# Patient Record
Sex: MALE | URBAN - METROPOLITAN AREA
[De-identification: names, ages, dates, MRNs, and addresses within clinical notes are randomized per-mention and may not be internally consistent; named-entity substitution may affect disease eponyms.]

---

## 2018-01-29 ENCOUNTER — RECORDS - HEALTHEAST (OUTPATIENT)
Dept: LAB | Facility: CLINIC | Age: 53
End: 2018-01-29

## 2018-01-30 LAB
ANION GAP SERPL CALCULATED.3IONS-SCNC: 14 MMOL/L (ref 5–18)
BUN SERPL-MCNC: 7 MG/DL (ref 8–22)
CALCIUM SERPL-MCNC: 9.9 MG/DL (ref 8.5–10.5)
CHLORIDE BLD-SCNC: 102 MMOL/L (ref 98–107)
CHOLEST SERPL-MCNC: 267 MG/DL
CO2 SERPL-SCNC: 25 MMOL/L (ref 22–31)
CREAT SERPL-MCNC: 1.01 MG/DL (ref 0.7–1.3)
FASTING STATUS PATIENT QL REPORTED: ABNORMAL
GFR SERPL CREATININE-BSD FRML MDRD: >60 ML/MIN/1.73M2
GLUCOSE BLD-MCNC: 96 MG/DL (ref 70–125)
HDLC SERPL-MCNC: 51 MG/DL
LDLC SERPL CALC-MCNC: 124 MG/DL
LDLC SERPL CALC-MCNC: ABNORMAL MG/DL
POTASSIUM BLD-SCNC: 3.8 MMOL/L (ref 3.5–5)
PSA SERPL-MCNC: 0.7 NG/ML (ref 0–3.5)
SODIUM SERPL-SCNC: 141 MMOL/L (ref 136–145)
TRIGL SERPL-MCNC: 540 MG/DL

## 2018-09-17 ENCOUNTER — RECORDS - HEALTHEAST (OUTPATIENT)
Dept: LAB | Facility: CLINIC | Age: 53
End: 2018-09-17

## 2018-09-18 LAB — BACTERIA SPEC CULT: NO GROWTH

## 2019-02-27 ENCOUNTER — RECORDS - HEALTHEAST (OUTPATIENT)
Dept: LAB | Facility: CLINIC | Age: 54
End: 2019-02-27

## 2019-02-28 LAB — BACTERIA SPEC CULT: NO GROWTH

## 2020-11-11 ENCOUNTER — PATIENT OUTREACH (OUTPATIENT)
Dept: CARE COORDINATION | Facility: CLINIC | Age: 55
End: 2020-11-11

## 2020-11-11 DIAGNOSIS — Z65.9 PSYCHOSOCIAL PROBLEM: Primary | ICD-10-CM

## 2020-11-11 ASSESSMENT — ACTIVITIES OF DAILY LIVING (ADL): DEPENDENT_IADLS:: INDEPENDENT

## 2020-11-11 NOTE — LETTER
[facility]   [address], [place] [address]  November 11, 2020    Jun Charles  [address]  [facility] [place] [facility] [place] [address]      Dear Jun,    I am a clinic care coordinator who works with Varinder Chen MD at [facility]. I wanted to thank you for spending the time to talk with me.  Below is a description of clinic care coordination and how I can further assist you.      The goal of clinic care coordination is to help you manage your health and improve access to the health care system in the most efficient manner. The team can assist you in meeting your health care goals by providing education, coordinating services, strengthening the communication among your providers and supporting you with any resource needs.    Please feel free to contact me at [phone] with any questions or concerns. We are focused on providing you with the highest-quality healthcare experience possible and that all starts with you.     Sincerely,   MARY Birch  Clinic Care Coordinator  [phone]  [email or web address]

## 2020-11-11 NOTE — PROGRESS NOTES
Clinic Care Coordination Contact    Clinic Care Coordination Contact  OUTREACH    Referral Information:  Referral Source: Care Team    Primary Diagnosis: Behavioral Health    Chief Complaint   Patient presents with     Clinic Care Coordination - Initial     JOSE A REA initial         Universal Utilization:   Clinic Utilization: [facility]  Difficulty keeping appointments:: No  Compliance Concerns: No  No-Show Concerns: No  No PCP office visit in Past Year: No    Clinical Concerns:  Current Medical Concerns: Patient is currently homeless and living in his car. Patient needs assistance in getting a psychiatric evaluation so that he can get housing through [employer].   Current Behavioral Concerns: Patient has Generalized anxiety disorder, he is needing a psychiatric evaluation to complete the documentations needed for [employer] Housing program. JOSE A REA assisted patient in making an appointment with [facility] (11/16/2020 @12:20).   Education Provided to patient: JOSE A REA called and spoke with patient; introduced self, discussed role of Care Coordination, and explained reason for call.  Pain  Pain (GOAL):: No  Health Maintenance Reviewed: Up to date      Medication Management:  Medication reconciliation status: Medications reviewed and reconciled.  Continue medications without change.    Functional Status:  Dependent ADLs:: Independent  Dependent IADLs:: Independent  Bed or wheelchair confined:: No  Mobility Status: Independent  Fallen 2 or more times in the past year?: No  Any fall with injury in the past year?: No    Living Situation:  Current living arrangement:: I live alone(Homeless)    Lifestyle & Psychosocial Needs:        Diet:: Regular  Inadequate nutrition (GOAL):: No  Tube Feeding: No  Inadequate activity/exercise (GOAL):: No  Significant changes in sleep pattern (GOAL): No  Transportation means:: Regular car     Gnosticist or spiritual beliefs that impact treatment:: No  Mental health DX::  Yes  Mental health DX how managed:: Medication  Mental health management concern (GOAL):: Yes  Informal Support system:: None   Socioeconomic History     Marital status: Single     Spouse name: Not on file     Number of children: Not on file     Years of education: Not on file     Highest education level: Not on file        Resources and Interventions:  Current Resources: [facility] , [employer] ( [facility], [phone])      Community Resources: Other (see comment)([employer]  Supplies used at home:: None  Equipment Currently Used at Home: none  Employment Status: (unemployed)      Advance Care Plan/Directive  Advanced Care Plans/Directives on file:: No  Advanced Care Plan/Directive Status: Not Applicable    Referrals Placed: Behavioral Health Providers     Goals:   Goals        General    1. Psychosocial (pt-stated)     Notes - Note created  11/11/2020  2:38 PM by Sandra Guerra LSW    Goal Statement: I will have a psychiatric evaluation completed so that I am able to obtain housing through [employer] In the next 3 months.   Date Goal set: 11/11/2020  Barriers: Provider availability   Strengths: Motivated   Date to Achieve By: 3/11/2021  Patient expressed understanding of goal: Yes   Action steps to achieve this goal:  1. I will work with the CC SW to make a psychiatric evaluation or a compression evaluation appointment.   2. I will finalize documents for [employer] to assist me with housing.   3. I will call the CC SW with questions or concerns.             Patient/Caregiver understanding: Patient verbalized understanding, engaged in [employer] communication during patient encounter.    Outreach Frequency: monthly  Patient does not have physical address, introduction letter/complex care plan were not sent.     Plan:   -CC SW assisted patient in making an appointment with [facility] for psychiatric evaluation.   - Patient to attend appointment on 11/16/2020 at 12:20.   - Once patient  completes his evaluation, he will have his evaluation sent to [employer] For next steps in the housing process.     MARY Birch  Clinic Care Coordinator  [phone]  [email or web address]

## 2020-11-11 NOTE — LETTER
[facility]  Complex Care Plan  About Me:    Patient Name:  Jun Charles    YOB: 1965  Age:         55 year old   Michael MRN:    [id number] Telephone Information:  Home Phone [phone]   Mobile [phone]       Address:  [address]  [place] [address] Email address:  No e-mail address on record      Emergency Contact(s)    Name Relationship Lgl Grd Work Phone Home Phone Mobile Phone           Primary language:  Data Unavailable     needed? Data Unavailable   Michael Language Services:  [phone] op. 1  Other communication barriers: None  Preferred Method of Communication:     Current living arrangement: I live alone(Homeless)  Mobility Status/ Medical Equipment: Independent    Health Maintenance  Health Maintenance Reviewed: Up to date    My Access Plan  Medical Emergency 911   Primary Clinic Line [facility] [phone]   Behavioral Health Crisis Line The National Suicide Prevention Lifeline at 1-197.923.9821 or 919             My Care Team Members  Patient Care Team       Relationship Specialty Notifications Start End    Varinder Chen MD PCP - General Family Medicine  11/11/20     Phone: [phone] Fax: [phone]         [address] [place] [address]    Sandra Guerra LSW Lead Care Coordinator   11/11/20             My Care Plans  Self Management and Treatment Plan  Goals and (Comments)  Goals        General    1. Psychosocial (pt-stated)     Notes - Note created  11/11/2020  2:38 PM by Sandra Guerra LSW    Goal Statement: I will have a psychiatric evaluation completed so that I am able to obtain housing through [employer] In the next 3 months.   Date Goal set: 11/11/2020  Barriers: Provider availability   Strengths: Motivated   Date to Achieve By: 3/11/2021  Patient expressed understanding of goal: Yes   Action steps to achieve this goal:  1. I will work with the  CC SW to make a psychiatric evaluation or a compression evaluation appointment.   2. I will finalize documents for [employer] to assist me with housing.   3. I will call the CC SW with questions or concerns.                     My Medical and Care Information  Problem List   There is no problem list on file for this patient.     Current Medications and Allergies:  See printed Medication Report.    Care Coordination Start Date: 11/11/2020   Frequency of Care Coordination: monthly   Form Last Updated: 11/11/2020

## 2020-11-11 NOTE — PROGRESS NOTES
Clinic Care Coordination Contact  [facility]/Voicemail    Referral Source: Care Team  Clinical Data: Care Coordinator Outreach  Outreach attempted x 1.  Left message on patient's voicemail with call back information and requested return call.  Plan: Care Coordinator will try to reach patient again in 1-2 business days.    MARY Birch  Clinic Care Coordinator  [phone]  [email or web address]

## 2020-11-18 ENCOUNTER — PATIENT OUTREACH (OUTPATIENT)
Dept: CARE COORDINATION | Facility: CLINIC | Age: 55
End: 2020-11-18

## 2020-11-18 DIAGNOSIS — Z65.9 PSYCHOSOCIAL PROBLEM: Primary | ICD-10-CM

## 2020-11-18 NOTE — PROGRESS NOTES
Clinic Care Coordination Contact  [facility]/Voicemail       Clinical Data: Care Coordinator Outreach  Outreach attempted x 1.  Left message on patient's voicemail with call back information and requested return call.  Plan: Care Coordinator will try to reach patient again in 1 month.      MARY Birch  Clinic Care Coordinator  [phone]  [email or web address]

## 2020-11-18 NOTE — PROGRESS NOTES
Clinic Care Coordination Contact    Follow Up Progress Note      Assessment: JOSE A REA outreached to patient to ensure that he was able to complete his psych eval on Monday 11/16/2020. He was able to complete it and will have his evaluation sent to [employer]. He is staying in a Crisis Center right now due to the cold and will be staying there for 10 days. They are utilizing strict guidelines due to Covid and he feels safe there. He had a virtual tour today of a house that he would share with 3 other people, he is looking forward to moving into a house and out of his car.   His plan once he is in a home is to find work and move out into his own apartment.     Goals addressed this encounter:   Goals Addressed                 This Visit's Progress       Patient Stated      1. Psychosocial (pt-stated)   50%     Goal Statement: I will have a psychiatric evaluation completed so that I am able to obtain housing through [employer] In the next 3 months.   Date Goal set: 11/11/2020  Barriers: Provider availability   Strengths: Motivated   Date to Achieve By: 3/11/2021  Patient expressed understanding of goal: Yes   Action steps to achieve this goal:  1. I will work with the JOSE A REA to make a psychiatric evaluation or a compression evaluation appointment.   2. I will finalize documents for [employer] to assist me with housing.   3. I will call the CC ÁLVARO with questions or concerns.     11/18/2020- Patient had his psychiatric evaluation on 11/16/2020. He is currently in Crisis Center for 10 days to get off the street. Goal 50%               Intervention/Education provided during outreach: CC SW offered assistance in finding a job once he is settled if needed. Patient said he will certainly utilize any assistance he can get.    Outreach Frequency: monthly    Plan:   -Patient to work with [employer] find housing.     Care Coordinator will follow up in 1 month.    MARY Birch  Clinic Care  Coordinator  [phone]  [email or web address]

## 2020-12-29 ENCOUNTER — PATIENT OUTREACH (OUTPATIENT)
Dept: CARE COORDINATION | Facility: CLINIC | Age: 55
End: 2020-12-29

## 2020-12-29 DIAGNOSIS — Z65.9 PSYCHOSOCIAL PROBLEM: Primary | ICD-10-CM

## 2020-12-29 ASSESSMENT — ACTIVITIES OF DAILY LIVING (ADL): DEPENDENT_IADLS:: INDEPENDENT

## 2020-12-29 NOTE — PROGRESS NOTES
Clinic Care Coordination Contact  [facility]/Voicemail    Referral Source: Care Team  Clinical Data: Care Coordinator Outreach  Outreach attempted x 1.  Left message on patient's voicemail with call back information and requested return call.  Plan:  Care Coordinator will try to reach patient again in 3-5 business days.    MARY Birch  Clinic Care Coordinator  [phone]  [email or web address]

## 2021-01-12 ENCOUNTER — PATIENT OUTREACH (OUTPATIENT)
Dept: CARE COORDINATION | Facility: CLINIC | Age: 56
End: 2021-01-12

## 2021-01-12 DIAGNOSIS — Z65.9 PSYCHOSOCIAL PROBLEM: Primary | ICD-10-CM

## 2021-01-12 SDOH — SOCIAL STABILITY: SOCIAL NETWORK: HOW OFTEN DO YOU ATTEND CHURCH OR RELIGIOUS SERVICES?: NEVER

## 2021-01-12 SDOH — SOCIAL STABILITY: SOCIAL NETWORK: HOW OFTEN DO YOU ATTENT MEETINGS OF THE CLUB OR ORGANIZATION YOU BELONG TO?: NEVER

## 2021-01-12 SDOH — SOCIAL STABILITY: SOCIAL INSECURITY: WITHIN THE LAST YEAR, HAVE YOU BEEN HUMILIATED OR EMOTIONALLY ABUSED IN OTHER WAYS BY YOUR PARTNER OR EX-PARTNER?: NO

## 2021-01-12 SDOH — ECONOMIC STABILITY: TRANSPORTATION INSECURITY
IN THE PAST 12 MONTHS, HAS THE LACK OF TRANSPORTATION KEPT YOU FROM MEDICAL APPOINTMENTS OR FROM GETTING MEDICATIONS?: NO

## 2021-01-12 SDOH — HEALTH STABILITY: MENTAL HEALTH
STRESS IS WHEN SOMEONE FEELS TENSE, NERVOUS, ANXIOUS, OR CAN'T SLEEP AT NIGHT BECAUSE THEIR MIND IS TROUBLED. HOW STRESSED ARE YOU?: RATHER MUCH

## 2021-01-12 SDOH — HEALTH STABILITY: MENTAL HEALTH: HOW OFTEN DO YOU HAVE 6 OR MORE DRINKS ON ONE OCCASION?: NEVER

## 2021-01-12 SDOH — ECONOMIC STABILITY: TRANSPORTATION INSECURITY
IN THE PAST 12 MONTHS, HAS LACK OF TRANSPORTATION KEPT YOU FROM MEETINGS, WORK, OR FROM GETTING THINGS NEEDED FOR DAILY LIVING?: NO

## 2021-01-12 SDOH — HEALTH STABILITY: PHYSICAL HEALTH: ON AVERAGE, HOW MANY DAYS PER WEEK DO YOU ENGAGE IN MODERATE TO STRENUOUS EXERCISE (LIKE A BRISK WALK)?: 0 DAYS

## 2021-01-12 SDOH — SOCIAL STABILITY: SOCIAL INSECURITY
WITHIN THE LAST YEAR, HAVE YOU BEEN KICKED, HIT, SLAPPED, OR OTHERWISE PHYSICALLY HURT BY YOUR PARTNER OR EX-PARTNER?: NO

## 2021-01-12 SDOH — SOCIAL STABILITY: SOCIAL NETWORK: IN A TYPICAL WEEK, HOW MANY TIMES DO YOU TALK ON THE PHONE WITH FAMILY, FRIENDS, OR NEIGHBORS?: THREE TIMES A WEEK

## 2021-01-12 SDOH — ECONOMIC STABILITY: FOOD INSECURITY: WITHIN THE PAST 12 MONTHS, YOU WORRIED THAT YOUR FOOD WOULD RUN OUT BEFORE YOU GOT MONEY TO BUY MORE.: SOMETIMES TRUE

## 2021-01-12 SDOH — ECONOMIC STABILITY: FOOD INSECURITY: WITHIN THE PAST 12 MONTHS, THE FOOD YOU BOUGHT JUST DIDN'T LAST AND YOU DIDN'T HAVE MONEY TO GET MORE.: SOMETIMES TRUE

## 2021-01-12 SDOH — HEALTH STABILITY: PHYSICAL HEALTH: ON AVERAGE, HOW MANY MINUTES DO YOU ENGAGE IN EXERCISE AT THIS LEVEL?: 0 MIN

## 2021-01-12 SDOH — SOCIAL STABILITY: SOCIAL INSECURITY: WITHIN THE LAST YEAR, HAVE YOU BEEN AFRAID OF YOUR PARTNER OR EX-PARTNER?: NO

## 2021-01-12 SDOH — SOCIAL STABILITY: SOCIAL NETWORK: HOW OFTEN DO YOU GET TOGETHER WITH FRIENDS OR RELATIVES?: TWICE A WEEK

## 2021-01-12 SDOH — HEALTH STABILITY: MENTAL HEALTH: HOW OFTEN DO YOU HAVE A DRINK CONTAINING ALCOHOL?: 2-4 TIMES A MONTH

## 2021-01-12 SDOH — HEALTH STABILITY: MENTAL HEALTH: HOW MANY STANDARD DRINKS CONTAINING ALCOHOL DO YOU HAVE ON A TYPICAL DAY?: 1 OR 2

## 2021-01-12 SDOH — SOCIAL STABILITY: SOCIAL INSECURITY
WITHIN THE LAST YEAR, HAVE TO BEEN RAPED OR FORCED TO HAVE ANY KIND OF SEXUAL ACTIVITY BY YOUR PARTNER OR EX-PARTNER?: NO

## 2021-01-12 SDOH — ECONOMIC STABILITY: INCOME INSECURITY: HOW HARD IS IT FOR YOU TO PAY FOR THE VERY BASICS LIKE FOOD, HOUSING, MEDICAL CARE, AND HEATING?: HARD

## 2021-01-12 SDOH — SOCIAL STABILITY: SOCIAL NETWORK
DO YOU BELONG TO ANY CLUBS OR ORGANIZATIONS SUCH AS CHURCH GROUPS UNIONS, FRATERNAL OR ATHLETIC GROUPS, OR SCHOOL GROUPS?: NO

## 2021-01-12 ASSESSMENT — ACTIVITIES OF DAILY LIVING (ADL): DEPENDENT_IADLS:: INDEPENDENT

## 2021-01-12 NOTE — PROGRESS NOTES
Clinic Care Coordination Contact      Goals addressed this encounter: Follow Up Progress Note      Assessment: JOSE A REA called patient to get an update on how he is doing with his goal progression. With last outreach patient was residing in Crisis Housing. He is now residing in a group home with the intent for this to be a short term residence. He is working with "[employer]. for Housing Stabilization so that he can eventually have independent housing.     Intervention/Education provided during outreach: JOSE A REA asked how patient was doing with food and other finances right now he is receiving his meals from the group home. He is also working at [employer] in [employer]. He feels that he is in a much better spot than he was.   Outreach Frequency: monthly    Plan:   - Patient to continue working with RIDDHI SANDOVAL on his needs.   Care Coordinator will follow up in 1 month.   Goals Addressed                 This Visit's Progress       Patient Stated      1. Psychosocial (pt-stated)        Goal Statement: I will have a psychiatric evaluation completed so that I am able to obtain housing through [employer] In the next 3 months.   Date Goal set: 11/11/2020  Barriers: Provider availability   Strengths: Motivated   Date to Achieve By: 3/11/2021  Patient expressed understanding of goal: Yes   Action steps to achieve this goal:  1. I will work with the JOSE A REA to make a psychiatric evaluation or a compression evaluation appointment.   2. I will finalize documents for [employer] to assist me with housing.   3. I will call the JOSE A REA with questions or concerns.     1/12/2021- Patient is currently living in a group home; he will be receiving Housing Stabilization Services through "[employer] for long term housing. Goal 50%                   MARY Birch  Clinic Care Coordinator  [phone]  [email or web address]

## 2021-02-18 ENCOUNTER — RECORDS - HEALTHEAST (OUTPATIENT)
Dept: LAB | Facility: CLINIC | Age: 56
End: 2021-02-18

## 2021-02-18 LAB
ALBUMIN SERPL-MCNC: 4.2 G/DL (ref 3.5–5)
ALP SERPL-CCNC: 53 U/L (ref 45–120)
ALT SERPL W P-5'-P-CCNC: 13 U/L (ref 0–45)
ANION GAP SERPL CALCULATED.3IONS-SCNC: 10 MMOL/L (ref 5–18)
AST SERPL W P-5'-P-CCNC: 15 U/L (ref 0–40)
BILIRUB SERPL-MCNC: 0.5 MG/DL (ref 0–1)
BUN SERPL-MCNC: 10 MG/DL (ref 8–22)
CALCIUM SERPL-MCNC: 9.6 MG/DL (ref 8.5–10.5)
CHLORIDE BLD-SCNC: 105 MMOL/L (ref 98–107)
CHOLEST SERPL-MCNC: 188 MG/DL
CO2 SERPL-SCNC: 27 MMOL/L (ref 22–31)
CREAT SERPL-MCNC: 1.09 MG/DL (ref 0.7–1.3)
FASTING STATUS PATIENT QL REPORTED: NO
GFR SERPL CREATININE-BSD FRML MDRD: >60 ML/MIN/1.73M2
GLUCOSE BLD-MCNC: 77 MG/DL (ref 70–125)
HDLC SERPL-MCNC: 48 MG/DL
LDLC SERPL CALC-MCNC: 83 MG/DL
POTASSIUM BLD-SCNC: 4.2 MMOL/L (ref 3.5–5)
PROT SERPL-MCNC: 7.5 G/DL (ref 6–8)
SODIUM SERPL-SCNC: 142 MMOL/L (ref 136–145)
TRIGL SERPL-MCNC: 283 MG/DL
TSH SERPL DL<=0.005 MIU/L-ACNC: 1.65 UIU/ML (ref 0.3–5)
VIT B12 SERPL-MCNC: <146 PG/ML (ref 213–816)

## 2021-02-19 ENCOUNTER — PATIENT OUTREACH (OUTPATIENT)
Dept: CARE COORDINATION | Facility: CLINIC | Age: 56
End: 2021-02-19

## 2021-02-19 DIAGNOSIS — Z65.9 PSYCHOSOCIAL PROBLEM: Primary | ICD-10-CM

## 2021-02-19 NOTE — PROGRESS NOTES
Clinic Care Coordination Contact    Follow Up Progress Note      Assessment: CC SW called patient today to check in. He is doing really well, he is now out of the group home and he's into his own place. He is very happy to be in his own place and no longer homeless, he is working at [employer]. He has re-applied for food stamps and is able to provide food for himself from working. He has no other needs at this time. He does not feel that he needs to work with CCSW and will call if needed.     Plan:   No further outreaches will be made at this time unless a new referral is made or a change in the pt's status occurs. Patient was provided with CC SW contact information and encouraged to call with any questions or concerns.    Goals addressed this encounter:   Goals Addressed                 This Visit's Progress       Patient Stated      COMPLETED: 1. Psychosocial (pt-stated)   100%     Goal Statement: I will have a psychiatric evaluation completed so that I am able to obtain housing through [employer] In the next 3 months.   Date Goal set: 11/11/2020  Barriers: Provider availability   Strengths: Motivated   Date to Achieve By: 3/11/2021  Patient expressed understanding of goal: Yes   Action steps to achieve this goal:  1. I will work with the CC SW to make a psychiatric evaluation or a compression evaluation appointment.   2. I will finalize documents for [employer] to assist me with housing.   3. I will call the CC SW with questions or concerns.     2/19/2021- Patient is now living in his own place, working full time.Goal 100%            MARY Birch  Clinic Care Coordinator  [phone]  [email or web address]

## 2021-04-01 ENCOUNTER — RECORDS - HEALTHEAST (OUTPATIENT)
Dept: LAB | Facility: CLINIC | Age: 56
End: 2021-04-01

## 2021-04-01 LAB — VIT B12 SERPL-MCNC: 429 PG/ML (ref 213–816)

## 2021-05-27 ENCOUNTER — RECORDS - HEALTHEAST (OUTPATIENT)
Dept: ADMINISTRATIVE | Facility: CLINIC | Age: 56
End: 2021-05-27

## 2021-10-18 ENCOUNTER — LAB REQUISITION (OUTPATIENT)
Dept: LAB | Facility: CLINIC | Age: 56
End: 2021-10-18

## 2021-10-18 DIAGNOSIS — R68.81 EARLY SATIETY: ICD-10-CM

## 2021-10-18 LAB
ALBUMIN SERPL-MCNC: 4.2 G/DL (ref 3.5–5)
ALP SERPL-CCNC: 50 U/L (ref 45–120)
ALT SERPL W P-5'-P-CCNC: 15 U/L (ref 0–45)
ANION GAP SERPL CALCULATED.3IONS-SCNC: 10 MMOL/L (ref 5–18)
AST SERPL W P-5'-P-CCNC: 17 U/L (ref 0–40)
BILIRUB SERPL-MCNC: 0.3 MG/DL (ref 0–1)
BUN SERPL-MCNC: 6 MG/DL (ref 8–22)
CALCIUM SERPL-MCNC: 9.9 MG/DL (ref 8.5–10.5)
CHLORIDE BLD-SCNC: 103 MMOL/L (ref 98–107)
CO2 SERPL-SCNC: 26 MMOL/L (ref 22–31)
CREAT SERPL-MCNC: 1.01 MG/DL (ref 0.7–1.3)
GFR SERPL CREATININE-BSD FRML MDRD: 83 ML/MIN/1.73M2
GLUCOSE BLD-MCNC: 103 MG/DL (ref 70–125)
POTASSIUM BLD-SCNC: 4.2 MMOL/L (ref 3.5–5)
PROT SERPL-MCNC: 7.5 G/DL (ref 6–8)
SODIUM SERPL-SCNC: 139 MMOL/L (ref 136–145)

## 2021-10-18 PROCEDURE — 82040 ASSAY OF SERUM ALBUMIN: CPT | Performed by: FAMILY MEDICINE

## 2021-12-03 ENCOUNTER — LAB REQUISITION (OUTPATIENT)
Dept: LAB | Facility: CLINIC | Age: 56
End: 2021-12-03

## 2021-12-03 DIAGNOSIS — R10.9 UNSPECIFIED ABDOMINAL PAIN: ICD-10-CM

## 2021-12-03 LAB
ALBUMIN SERPL-MCNC: 4.3 G/DL (ref 3.5–5)
ALP SERPL-CCNC: 54 U/L (ref 45–120)
ALT SERPL W P-5'-P-CCNC: 16 U/L (ref 0–45)
ANION GAP SERPL CALCULATED.3IONS-SCNC: 13 MMOL/L (ref 5–18)
AST SERPL W P-5'-P-CCNC: 23 U/L (ref 0–40)
BILIRUB SERPL-MCNC: 0.7 MG/DL (ref 0–1)
BUN SERPL-MCNC: 9 MG/DL (ref 8–22)
CALCIUM SERPL-MCNC: 10.3 MG/DL (ref 8.5–10.5)
CHLORIDE BLD-SCNC: 103 MMOL/L (ref 98–107)
CO2 SERPL-SCNC: 25 MMOL/L (ref 22–31)
CREAT SERPL-MCNC: 1.08 MG/DL (ref 0.7–1.3)
GFR SERPL CREATININE-BSD FRML MDRD: 77 ML/MIN/1.73M2
GLUCOSE BLD-MCNC: 100 MG/DL (ref 70–125)
POTASSIUM BLD-SCNC: 4.2 MMOL/L (ref 3.5–5)
PROT SERPL-MCNC: 7.7 G/DL (ref 6–8)
SODIUM SERPL-SCNC: 141 MMOL/L (ref 136–145)

## 2021-12-03 PROCEDURE — 80053 COMPREHEN METABOLIC PANEL: CPT | Performed by: FAMILY MEDICINE

## 2022-02-28 ENCOUNTER — LAB REQUISITION (OUTPATIENT)
Dept: LAB | Facility: CLINIC | Age: 57
End: 2022-02-28

## 2022-02-28 DIAGNOSIS — E53.8 DEFICIENCY OF OTHER SPECIFIED B GROUP VITAMINS: ICD-10-CM

## 2022-02-28 DIAGNOSIS — E78.1 PURE HYPERGLYCERIDEMIA: ICD-10-CM

## 2022-02-28 LAB
CHOLEST SERPL-MCNC: 203 MG/DL
HDLC SERPL-MCNC: 44 MG/DL
LDLC SERPL CALC-MCNC: 107 MG/DL
TRIGL SERPL-MCNC: 260 MG/DL
VIT B12 SERPL-MCNC: 597 PG/ML (ref 213–816)

## 2022-02-28 PROCEDURE — 82607 VITAMIN B-12: CPT | Performed by: FAMILY MEDICINE

## 2022-02-28 PROCEDURE — 80061 LIPID PANEL: CPT | Performed by: FAMILY MEDICINE
